# Patient Record
Sex: MALE | Race: WHITE | Employment: UNEMPLOYED | URBAN - METROPOLITAN AREA
[De-identification: names, ages, dates, MRNs, and addresses within clinical notes are randomized per-mention and may not be internally consistent; named-entity substitution may affect disease eponyms.]

---
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
## 2022-01-28 ENCOUNTER — OFFICE VISIT (OUTPATIENT)
Dept: INTERNAL MEDICINE CLINIC | Age: 22
End: 2022-01-28
Payer: MEDICARE

## 2022-01-28 VITALS
HEIGHT: 67 IN | WEIGHT: 144.8 LBS | HEART RATE: 77 BPM | SYSTOLIC BLOOD PRESSURE: 148 MMHG | OXYGEN SATURATION: 99 % | DIASTOLIC BLOOD PRESSURE: 80 MMHG | BODY MASS INDEX: 22.73 KG/M2

## 2022-01-28 DIAGNOSIS — R42 DIZZINESS: Primary | ICD-10-CM

## 2022-01-28 DIAGNOSIS — U09.9 POST-COVID SYNDROME: ICD-10-CM

## 2022-01-28 PROCEDURE — 99203 OFFICE O/P NEW LOW 30 MIN: CPT | Performed by: INTERNAL MEDICINE

## 2022-01-28 PROCEDURE — G8427 DOCREV CUR MEDS BY ELIG CLIN: HCPCS | Performed by: INTERNAL MEDICINE

## 2022-01-28 PROCEDURE — 1036F TOBACCO NON-USER: CPT | Performed by: INTERNAL MEDICINE

## 2022-01-28 PROCEDURE — G8484 FLU IMMUNIZE NO ADMIN: HCPCS | Performed by: INTERNAL MEDICINE

## 2022-01-28 PROCEDURE — G8420 CALC BMI NORM PARAMETERS: HCPCS | Performed by: INTERNAL MEDICINE

## 2022-01-28 RX ORDER — MECLIZINE HCL 12.5 MG/1
12.5 TABLET ORAL 3 TIMES DAILY PRN
Qty: 15 TABLET | Refills: 1 | Status: CANCELLED | OUTPATIENT
Start: 2022-01-28 | End: 2022-02-07

## 2022-01-28 SDOH — ECONOMIC STABILITY: FOOD INSECURITY: WITHIN THE PAST 12 MONTHS, YOU WORRIED THAT YOUR FOOD WOULD RUN OUT BEFORE YOU GOT MONEY TO BUY MORE.: NEVER TRUE

## 2022-01-28 SDOH — ECONOMIC STABILITY: FOOD INSECURITY: WITHIN THE PAST 12 MONTHS, THE FOOD YOU BOUGHT JUST DIDN'T LAST AND YOU DIDN'T HAVE MONEY TO GET MORE.: NEVER TRUE

## 2022-01-28 ASSESSMENT — PATIENT HEALTH QUESTIONNAIRE - PHQ9
2. FEELING DOWN, DEPRESSED OR HOPELESS: 1
SUM OF ALL RESPONSES TO PHQ QUESTIONS 1-9: 1
SUM OF ALL RESPONSES TO PHQ9 QUESTIONS 1 & 2: 1
SUM OF ALL RESPONSES TO PHQ QUESTIONS 1-9: 1
1. LITTLE INTEREST OR PLEASURE IN DOING THINGS: 0

## 2022-01-28 ASSESSMENT — SOCIAL DETERMINANTS OF HEALTH (SDOH): HOW HARD IS IT FOR YOU TO PAY FOR THE VERY BASICS LIKE FOOD, HOUSING, MEDICAL CARE, AND HEATING?: NOT HARD AT ALL

## 2022-01-28 NOTE — PROGRESS NOTES
Visit Information    Have you changed or started any medications since your last visit including any over-the-counter medicines, vitamins, or herbal medicines? no   Are you having any side effects from any of your medications? -  no  Have you stopped taking any of your medications? Is so, why? -  no    Have you seen any other physician or provider since your last visit? Yes - Records Obtained  Have you had any other diagnostic tests since your last visit? No  Have you been seen in the emergency room and/or had an admission to a hospital since we last saw you? Yes - Records Obtained  Have you had your routine dental cleaning in the past 6 months? no    Have you activated your [employer] account? If not, what are your barriers?  No:      Patient Care Team:  Danika Gomez MD as PCP - General (Internal Medicine)    Medical History Review  Past Medical, Family, and Social History reviewed and does contribute to the patient presenting condition    Health Maintenance   Topic Date Due    Hepatitis C screen  Never done    Varicella vaccine (1 of 2 - 2-dose childhood series) Never done    COVID-19 Vaccine (1) Never done    HPV vaccine (1 - Male 2-dose series) Never done    Depression Screen  Never done    HIV screen  Never done    Flu vaccine (1) Never done    DTaP/Tdap/Td vaccine (1 - Tdap) Never done    Hepatitis A vaccine  Aged Out    Hepatitis B vaccine  Aged Out    Hib vaccine  Aged Out    Meningococcal (ACWY) vaccine  Aged Out    Pneumococcal 0-64 years Vaccine  Aged Out

## 2022-01-29 NOTE — PROGRESS NOTES
[address]  [place] [address]  Dept: [phone]  Dept Fax: [phone]     Name: Edmund Pastor  : 2000           Chief Complaint   Patient presents with    New Patient    Loss of Consciousness     first episode was 2 years ago, he stood up to fast. Has been continuing for the past two years    Post-COVID Symptoms     Patient states that he feels like it still sometimes hard for him to take a deep breath        History of Present Illness:    HPI  24-year-old gentleman, longstanding history of off-and-on dizziness,  Head episode of Covid 19 infection in December, went to the ER, given some medications, did not have any dizziness since then,  Seems like patient has a positional vertigo, does not have any chest pain shortness of breath or palpitation,  Does not have any post dizziness confusion, less likely seizures,  Less likely cardiac in neurological cause,  Still will get to table testing,    Past Medical History:    No past medical history on file. Reviewed all health maintenance requirements and ordered appropriate tests  Health Maintenance Due   Topic Date Due    Hepatitis C screen  Never done    Varicella vaccine (1 of 2 - 2-dose childhood series) Never done    COVID-19 Vaccine (1) Never done    HPV vaccine (1 - Male 2-dose series) Never done    Depression Screen  Never done    HIV screen  Never done    Flu vaccine (1) Never done    DTaP/Tdap/Td vaccine (1 - Tdap) Never done       Past Surgical History:    No past surgical history on file. Medications:    No current outpatient medications on file. Allergies:      Patient has no known allergies. Social History:    Tobacco:    reports that he has never smoked. He has never used smokeless tobacco.  Alcohol:      reports no history of alcohol use. Drug Use:  reports current drug use. Drug: Marijuana Nadeem Tesfaye). Family History:    No family history on file.     Review of Systems:    Positive and Negative as described in HPI    Constitutional:  negative for  fevers, chills, sweats, fatigue, and weight loss  HEENT:  negative for vision or hearing changes,   Respiratory:  negative for shortness of breath, cough, or congestion  Cardiovascular:  negative for  chest pain, palpitations  Gastrointestinal:  negative for nausea, vomiting, diarrhea, constipation, abdominal pain  Genitourinary:  negative for frequency, dysuria  Integument/Breast:  negative for rash, skin lesions  Musculoskeletal:  negative for muscle aches or joint pain  Neurological:  negative for headaches, dizziness, lightheadedness, numbness, pain and tingling extrimities  Behavior/Psych:  negative for depression and anxiety      Physical Exam:    Vitals:  BP (!) 148/80 (Site: Left Upper Arm, Position: Standing)   Pulse 77   Ht 5' 7\" (1.702 m)   Wt 144 lb 12.8 oz (65.7 kg)   SpO2 99%   BMI 22.68 kg/m²     General appearance - alert, well appearing, and in no acute distress  Mental status - oriented to person, place, and time with normal affect  Head - normocephalic and atraumatic  Eyes - pupils equal and reactive, extraocular eye movements intact, conjunctiva clear  Ears - hearing appears to be intact  Nose - no drainage noted  Mouth - mucous membranes moist  Neck - supple, no carotid bruits, thyroid not palpable  Chest - clear to auscultation, normal effort  Heart - normal rate, regular rhythm, no murmurs  Abdomen - soft, nontender, nondistended, bowel sounds present all four quadrants, no masses, hepatomegaly or splenomegaly  Neurological - normal speech, no focal findings or movement disorder noted, cranial nerves II through XII grossly intact  Extremities - peripheral pulses palpable, no pedal edema or calf pain with palpation  Skin - no gross lesions, rashes, or induration noted      Data:    Lab Results   Component Value Date     12/23/2021    K 3.7 12/23/2021     12/23/2021    CO2 25 12/23/2021 BUN 14 12/23/2021    CREATININE 0.84 12/23/2021    GLUCOSE 173 12/23/2021    PROT 7.4 12/23/2021    LABALBU 4.3 12/23/2021    BILITOT 0.29 12/23/2021    ALKPHOS 75 12/23/2021    AST 11 12/23/2021    ALT 7 12/23/2021     Lab Results   Component Value Date    WBC 7.3 12/23/2021    RBC 5.06 12/23/2021    HGB 14.5 12/23/2021    HCT 42.8 12/23/2021    MCV 84.6 12/23/2021    MCH 28.5 12/23/2021    MCHC 33.7 12/23/2021    RDW 13.2 12/23/2021     12/23/2021    MPV 8.6 12/23/2021     No results found for: TSH  No results found for: CHOL, LDL, HDL, PSA, LABA1C       Assessment & Plan:     Diagnosis Orders   1. Dizziness  Tilt Table Test    CBC Auto Differential    Comprehensive Metabolic Panel    Lipid Panel    Hemoglobin A1C    TSH without Reflex    Vitamin D 25 Hydroxy    Urinalysis   2. Post-COVID syndrome  Tilt Table Test    CBC Auto Differential    Comprehensive Metabolic Panel    Lipid Panel    Hemoglobin A1C    TSH without Reflex    Vitamin D 25 Hydroxy    Urinalysis       1. Dizziness  -     Tilt Table Test  -     CBC Auto Differential; Future  -     Comprehensive Metabolic Panel; Future  -     Lipid Panel; Future  -     Hemoglobin A1C; Future  -     TSH without Reflex; Future  -     Vitamin D 25 Hydroxy; Future  -     Urinalysis; Future  2. Post-COVID syndrome  -     Tilt Table Test  -     CBC Auto Differential; Future  -     Comprehensive Metabolic Panel; Future  -     Lipid Panel; Future  -     Hemoglobin A1C; Future  -     TSH without Reflex; Future  -     Vitamin D 25 Hydroxy; Future  -     Urinalysis; Future       Complete blood work,  To table testing,  If not better will get the CT of the head, will see the patient and to 4 weeks,  No neurological deficit at this time will  We will get meclizine as needed        Completed Refills   Requested Prescriptions      No prescriptions requested or ordered in this encounter     Return in about 4 weeks (around 2/25/2022).   No orders of the defined types were placed in this encounter. Orders Placed This Encounter   Procedures    CBC Auto Differential     Standing Status:   Future     Standing Expiration Date:   1/28/2023    Comprehensive Metabolic Panel     Standing Status:   Future     Standing Expiration Date:   1/28/2023    Lipid Panel     Standing Status:   Future     Standing Expiration Date:   1/28/2023     Order Specific Question:   Is Patient Fasting?/# of Hours     Answer:   8-10 Hours    Hemoglobin A1C     Standing Status:   Future     Standing Expiration Date:   1/28/2023    TSH without Reflex     Standing Status:   Future     Standing Expiration Date:   1/28/2023    Vitamin D 25 Hydroxy     Standing Status:   Future     Standing Expiration Date:   1/28/2023    Urinalysis     Standing Status:   Future     Standing Expiration Date:   1/28/2023     Order Specific Question:   SPECIFY(EX-CATH,MIDSTREAM,CYSTO,ETC)?      Answer:   Mid Stream    Tilt Table Test       Electronically signed by Jessee Smith MD on 1/28/2022 at 10:34 PM

## 2023-10-31 ENCOUNTER — HOSPITAL ENCOUNTER (EMERGENCY)
Age: 23
Discharge: HOME | End: 2023-10-31
Attending: EMERGENCY MEDICINE
Payer: MEDICAID

## 2023-10-31 ENCOUNTER — APPOINTMENT (OUTPATIENT)
Dept: CT IMAGING | Age: 23
End: 2023-10-31
Payer: MEDICAID

## 2023-10-31 VITALS
RESPIRATION RATE: 14 BRPM | DIASTOLIC BLOOD PRESSURE: 79 MMHG | HEIGHT: 66 IN | OXYGEN SATURATION: 97 % | BODY MASS INDEX: 24.91 KG/M2 | HEART RATE: 78 BPM | WEIGHT: 155 LBS | TEMPERATURE: 98.2 F | SYSTOLIC BLOOD PRESSURE: 136 MMHG

## 2023-10-31 DIAGNOSIS — R51.9 ACUTE NONINTRACTABLE HEADACHE, UNSPECIFIED HEADACHE TYPE: Primary | ICD-10-CM

## 2023-10-31 LAB
ANION GAP SERPL CALCULATED.3IONS-SCNC: 11 MMOL/L (ref 9–17)
BASOPHILS # BLD: <0.03 K/UL (ref 0–0.2)
BASOPHILS NFR BLD: 0 % (ref 0–2)
BUN SERPL-MCNC: 11 MG/DL (ref 6–20)
BUN/CREAT SERPL: 16 (ref 9–20)
CALCIUM SERPL-MCNC: 9.6 MG/DL (ref 8.6–10.4)
CHLORIDE SERPL-SCNC: 102 MMOL/L (ref 98–107)
CO2 SERPL-SCNC: 27 MMOL/L (ref 20–31)
CREAT SERPL-MCNC: 0.7 MG/DL (ref 0.7–1.2)
EOSINOPHIL # BLD: 0.04 K/UL (ref 0–0.44)
EOSINOPHILS RELATIVE PERCENT: 0 % (ref 1–4)
ERYTHROCYTE [DISTWIDTH] IN BLOOD BY AUTOMATED COUNT: 12.5 % (ref 11.8–14.4)
FLUAV RNA RESP QL NAA+PROBE: NOT DETECTED
FLUBV RNA RESP QL NAA+PROBE: NOT DETECTED
GFR SERPL CREATININE-BSD FRML MDRD: >60 ML/MIN/1.73M2
GLUCOSE SERPL-MCNC: 126 MG/DL (ref 70–99)
HCT VFR BLD AUTO: 42.8 % (ref 40.7–50.3)
HGB BLD-MCNC: 14.5 G/DL (ref 13–17)
IMM GRANULOCYTES # BLD AUTO: 0.06 K/UL (ref 0–0.3)
IMM GRANULOCYTES NFR BLD: 1 %
LYMPHOCYTES NFR BLD: 0.96 K/UL (ref 1.2–5.2)
LYMPHOCYTES RELATIVE PERCENT: 9 % (ref 25–45)
MCH RBC QN AUTO: 29.2 PG (ref 25.2–33.5)
MCHC RBC AUTO-ENTMCNC: 33.9 G/DL (ref 28.4–34.8)
MCV RBC AUTO: 86.3 FL (ref 82.6–102.9)
MONOCYTES NFR BLD: 0.76 K/UL (ref 0.1–1.4)
MONOCYTES NFR BLD: 7 % (ref 2–8)
NEUTROPHILS NFR BLD: 83 % (ref 34–64)
NEUTS SEG NFR BLD: 8.9 K/UL (ref 1.8–8)
NRBC BLD-RTO: 0 PER 100 WBC
PLATELET # BLD AUTO: 169 K/UL (ref 138–453)
PMV BLD AUTO: 10.4 FL (ref 8.1–13.5)
POTASSIUM SERPL-SCNC: 4.2 MMOL/L (ref 3.7–5.3)
RBC # BLD AUTO: 4.96 M/UL (ref 4.21–5.77)
SARS-COV-2 RNA RESP QL NAA+PROBE: NOT DETECTED
SODIUM SERPL-SCNC: 140 MMOL/L (ref 135–144)
SOURCE: NORMAL
SPECIMEN DESCRIPTION: NORMAL
WBC OTHER # BLD: 10.7 K/UL (ref 4.5–13.5)

## 2023-10-31 PROCEDURE — 70450 CT HEAD/BRAIN W/O DYE: CPT

## 2023-10-31 PROCEDURE — 96375 TX/PRO/DX INJ NEW DRUG ADDON: CPT

## 2023-10-31 PROCEDURE — 6360000002 HC RX W HCPCS: Performed by: EMERGENCY MEDICINE

## 2023-10-31 PROCEDURE — 85025 COMPLETE CBC W/AUTO DIFF WBC: CPT

## 2023-10-31 PROCEDURE — 96365 THER/PROPH/DIAG IV INF INIT: CPT

## 2023-10-31 PROCEDURE — 99284 EMERGENCY DEPT VISIT MOD MDM: CPT

## 2023-10-31 PROCEDURE — 87636 SARSCOV2 & INF A&B AMP PRB: CPT

## 2023-10-31 PROCEDURE — 2580000003 HC RX 258: Performed by: EMERGENCY MEDICINE

## 2023-10-31 PROCEDURE — 80048 BASIC METABOLIC PNL TOTAL CA: CPT

## 2023-10-31 RX ORDER — KETOROLAC TROMETHAMINE 30 MG/ML
30 INJECTION, SOLUTION INTRAMUSCULAR; INTRAVENOUS ONCE
Status: COMPLETED | OUTPATIENT
Start: 2023-10-31 | End: 2023-10-31

## 2023-10-31 RX ORDER — 0.9 % SODIUM CHLORIDE 0.9 %
1000 INTRAVENOUS SOLUTION INTRAVENOUS ONCE
Status: COMPLETED | OUTPATIENT
Start: 2023-10-31 | End: 2023-10-31

## 2023-10-31 RX ORDER — DEXAMETHASONE SODIUM PHOSPHATE 10 MG/ML
10 INJECTION, SOLUTION INTRAMUSCULAR; INTRAVENOUS ONCE
Status: COMPLETED | OUTPATIENT
Start: 2023-10-31 | End: 2023-10-31

## 2023-10-31 RX ORDER — DIPHENHYDRAMINE HYDROCHLORIDE 50 MG/ML
25 INJECTION INTRAMUSCULAR; INTRAVENOUS ONCE
Status: COMPLETED | OUTPATIENT
Start: 2023-10-31 | End: 2023-10-31

## 2023-10-31 RX ORDER — ONDANSETRON 2 MG/ML
4 INJECTION INTRAMUSCULAR; INTRAVENOUS ONCE
Status: COMPLETED | OUTPATIENT
Start: 2023-10-31 | End: 2023-10-31

## 2023-10-31 RX ORDER — MAGNESIUM SULFATE 1 G/100ML
1000 INJECTION INTRAVENOUS ONCE
Status: COMPLETED | OUTPATIENT
Start: 2023-10-31 | End: 2023-10-31

## 2023-10-31 RX ADMIN — ONDANSETRON 4 MG: 2 INJECTION INTRAMUSCULAR; INTRAVENOUS at 08:30

## 2023-10-31 RX ADMIN — MAGNESIUM SULFATE HEPTAHYDRATE 1000 MG: 1 INJECTION, SOLUTION INTRAVENOUS at 09:57

## 2023-10-31 RX ADMIN — SODIUM CHLORIDE 1000 ML: 9 INJECTION, SOLUTION INTRAVENOUS at 08:30

## 2023-10-31 RX ADMIN — KETOROLAC TROMETHAMINE 30 MG: 30 INJECTION, SOLUTION INTRAMUSCULAR; INTRAVENOUS at 09:12

## 2023-10-31 RX ADMIN — SODIUM CHLORIDE 1000 ML: 9 INJECTION, SOLUTION INTRAVENOUS at 09:56

## 2023-10-31 RX ADMIN — DEXAMETHASONE SODIUM PHOSPHATE 10 MG: 10 INJECTION, SOLUTION INTRAMUSCULAR; INTRAVENOUS at 09:12

## 2023-10-31 RX ADMIN — DIPHENHYDRAMINE HYDROCHLORIDE 25 MG: 50 INJECTION INTRAMUSCULAR; INTRAVENOUS at 08:30

## 2023-10-31 ASSESSMENT — ENCOUNTER SYMPTOMS
EYE DISCHARGE: 0
ABDOMINAL PAIN: 0
EYE PAIN: 0
ABDOMINAL DISTENTION: 0
SHORTNESS OF BREATH: 0
CHEST TIGHTNESS: 0
BACK PAIN: 0
FACIAL SWELLING: 0

## 2023-10-31 ASSESSMENT — PAIN SCALES - GENERAL: PAINLEVEL_OUTOF10: 5

## 2023-10-31 ASSESSMENT — PAIN - FUNCTIONAL ASSESSMENT: PAIN_FUNCTIONAL_ASSESSMENT: 0-10

## 2023-10-31 ASSESSMENT — LIFESTYLE VARIABLES
HOW MANY STANDARD DRINKS CONTAINING ALCOHOL DO YOU HAVE ON A TYPICAL DAY: PATIENT DOES NOT DRINK
HOW OFTEN DO YOU HAVE A DRINK CONTAINING ALCOHOL: NEVER

## 2023-10-31 ASSESSMENT — PAIN DESCRIPTION - PAIN TYPE: TYPE: ACUTE PAIN

## 2023-10-31 ASSESSMENT — PAIN DESCRIPTION - ORIENTATION: ORIENTATION: POSTERIOR

## 2023-10-31 ASSESSMENT — PAIN DESCRIPTION - LOCATION: LOCATION: HEAD

## 2023-10-31 NOTE — ED PROVIDER NOTES
EMERGENCY DEPARTMENT ENCOUNTER    Pt Name: Mahi Luu  MRN: [id number]  [address] [facility] 2000  Date of evaluation: 10/31/23  CHIEF COMPLAINT       Chief Complaint   Patient presents with    Migraine     x5 days, no reported personal history has family history of mirgraines. HISTORY OF PRESENT ILLNESS   HPI  The patient is a 74-year-old male with history of migraine headaches who presented to the emergency department secondary to headache. Patient complains of a 5-day history of headache. Gradual onset localized to the back of his head with radiation to the front. He has had nausea and vomiting. He is taking over-the-counter medicine with no significant improvement. Of note, patient suddenly had food poisoning has had nausea and vomiting. Denied any trauma or injury. He was previously on Keppra for epilepsy does no longer take that medication. Patient denies vision changes or photophobia. He rates the pain 5 out of 10 on the pain scale localized to the back of his head with radiation to the front. Patient [place] chest pain, shortness of breath, nausea, vomiting, fevers or chills      REVIEW OF SYSTEMS     Review of Systems   Constitutional:  Negative for chills, diaphoresis and fever. HENT:  Negative for congestion, ear pain and facial swelling. Eyes:  Negative for pain, discharge and visual disturbance. Respiratory:  Negative for chest tightness and shortness of breath. Cardiovascular:  Negative for chest pain and palpitations. Gastrointestinal:  Negative for abdominal distention and abdominal pain. Genitourinary:  Negative for difficulty urinating and flank pain. Musculoskeletal:  Negative for back pain. Skin:  Negative for wound. Neurological:  Positive for headaches. Negative for dizziness, syncope, weakness, light-headedness and numbness. PASTMEDICAL HISTORY   History reviewed. No pertinent past medical history.   Past Problem List  There is no problem list on file for this

## 2023-12-02 ENCOUNTER — HOSPITAL ENCOUNTER (EMERGENCY)
Age: 23
Discharge: HOME | End: 2023-12-02
Attending: EMERGENCY MEDICINE
Payer: COMMERCIAL

## 2023-12-02 ENCOUNTER — APPOINTMENT (OUTPATIENT)
Dept: GENERAL RADIOLOGY | Age: 23
End: 2023-12-02
Payer: COMMERCIAL

## 2023-12-02 VITALS
RESPIRATION RATE: 16 BRPM | TEMPERATURE: 98.3 F | HEIGHT: 66 IN | BODY MASS INDEX: 25.02 KG/M2 | SYSTOLIC BLOOD PRESSURE: 137 MMHG | OXYGEN SATURATION: 100 % | DIASTOLIC BLOOD PRESSURE: 73 MMHG | HEART RATE: 99 BPM

## 2023-12-02 DIAGNOSIS — R07.89 ATYPICAL CHEST PAIN: Primary | ICD-10-CM

## 2023-12-02 LAB
EKG ATRIAL RATE: 89 BPM
EKG P AXIS: 61 DEGREES
EKG P-R INTERVAL: 136 MS
EKG Q-T INTERVAL: 352 MS
EKG QRS DURATION: 80 MS
EKG QTC CALCULATION (BAZETT): 428 MS
EKG R AXIS: 47 DEGREES
EKG T AXIS: 38 DEGREES
EKG VENTRICULAR RATE: 89 BPM

## 2023-12-02 PROCEDURE — 93005 ELECTROCARDIOGRAM TRACING: CPT

## 2023-12-02 PROCEDURE — 71046 X-RAY EXAM CHEST 2 VIEWS: CPT

## 2023-12-02 PROCEDURE — 6360000002 HC RX W HCPCS

## 2023-12-02 PROCEDURE — 99284 EMERGENCY DEPT VISIT MOD MDM: CPT

## 2023-12-02 PROCEDURE — 96372 THER/PROPH/DIAG INJ SC/IM: CPT

## 2023-12-02 RX ORDER — KETOROLAC TROMETHAMINE 30 MG/ML
30 INJECTION, SOLUTION INTRAMUSCULAR; INTRAVENOUS ONCE
Status: COMPLETED | OUTPATIENT
Start: 2023-12-02 | End: 2023-12-02

## 2023-12-02 RX ORDER — NAPROXEN 500 MG/1
500 TABLET ORAL 2 TIMES DAILY WITH MEALS
COMMUNITY
Start: 2023-11-28

## 2023-12-02 RX ORDER — METHOCARBAMOL 750 MG/1
TABLET, FILM COATED ORAL
COMMUNITY
Start: 2023-11-29

## 2023-12-02 RX ADMIN — KETOROLAC TROMETHAMINE 30 MG: 30 INJECTION, SOLUTION INTRAMUSCULAR at 21:13

## 2023-12-02 ASSESSMENT — ENCOUNTER SYMPTOMS
SORE THROAT: 0
VOMITING: 0
NAUSEA: 0
COUGH: 1
SINUS PRESSURE: 0
BACK PAIN: 1
DIARRHEA: 0
CHEST TIGHTNESS: 0
SINUS PAIN: 0
CONSTIPATION: 0
ABDOMINAL PAIN: 0
SHORTNESS OF BREATH: 0

## 2023-12-02 ASSESSMENT — PAIN DESCRIPTION - LOCATION: LOCATION: SHOULDER

## 2023-12-02 ASSESSMENT — PAIN SCALES - GENERAL
PAINLEVEL_OUTOF10: 5
PAINLEVEL_OUTOF10: 6

## 2023-12-02 ASSESSMENT — PAIN - FUNCTIONAL ASSESSMENT: PAIN_FUNCTIONAL_ASSESSMENT: 0-10

## 2023-12-02 ASSESSMENT — PAIN DESCRIPTION - DESCRIPTORS: DESCRIPTORS: SHARP

## 2023-12-02 ASSESSMENT — PAIN DESCRIPTION - ORIENTATION: ORIENTATION: RIGHT

## 2023-12-03 NOTE — ED PROVIDER NOTES
[address] [facility] ED  eMERGENCY dEPARTMENT eNCOUnter      Pt Name: Twyla Quevedo  MRN: [id number]  [address] [facility] 2000  Date of evaluation: 12/2/2023  Provider: DONNA David - [address] [facility]       Chief Complaint   Patient presents with    Back Pain     Lower back pain; ongoing for 3 weeks; reports also abd pain      Shortness of Breath     Reports unable to take deep breath; reports been on going for past 3 weeks    Shoulder Pain     Right sided; reports no injury; reports pain increases with deep breath         HISTORY OF PRESENT ILLNESS  (Location/Symptom, Timing/Onset, Context/Setting, Quality, Duration, Modifying Factors, Severity.)   Twyla Quevedo is a 26 y.o. male who presents to the emergency department with complaint of right upper back pain, right shoulder pain, right lower rib pain worse with inspiration. Patient was evaluated in urgent care 5 days ago and was diagnosed with a muscle strain, given naproxen and muscle relaxant. Patient states he has been taking them as prescribed without any relief. Patient states he did have a viral upper respiratory infection over a month ago and was treated for an ear infection. No other antibiotics since ear infection antibiotics were completed. No cardiac history, no exogenous hormone use, no recent long distance travel, no recent surgery, no sick contacts at home no history of blood clots, no trauma or injury, no heavy lifting. Patient states that he does vaping nicotine vape, no drugs or alcohol. Patient states the pain is always there 5 out of 10, worse with deep breaths and certain movements. Nursing Notes were reviewed. ALLERGIES     Patient has no known allergies.     CURRENT MEDICATIONS       Discharge Medication List as of 12/2/2023  9:51 PM        CONTINUE these medications which have NOT CHANGED    Details   methocarbamol (ROBAXIN) 750 MG tablet take 1 tablet by mouth four times a day if needed for muscle spasmHistorical

## 2023-12-03 NOTE — ED NOTES
Patient presents with c/o right shoulder pain, worsened by taking deep breaths, which has been ongoing for 3 weeks. Patient denies any accident, injury or trauma. Patient presents in no acute distress, respirations appear regular and unlabored.       Jung Waters RN  12/02/23 [id number]

## 2023-12-04 PROCEDURE — 93010 ELECTROCARDIOGRAM REPORT: CPT | Performed by: INTERNAL MEDICINE

## 2023-12-06 NOTE — ED PROVIDER NOTES
eMERGENCY dEPARTMENT eNCOUnter   [address] Name: Derrick Nunez  MRN: [id number]  [address] [place] 2000  Date of evaluation: 12/6/23     Derrick Nunez is a 26 y.o. male with CC: Back Pain (Lower back pain; ongoing for 3 weeks; reports also abd pain/), Shortness of Breath (Reports unable to take deep breath; reports been on going for past 3 weeks), and Shoulder Pain (Right sided; reports no injury; reports pain increases with deep breath)      This visit was performed by both a physician and an APC. I performed all aspects of the MDM as documented. Based on the medical record the care appears appropriate. I was personally available for consultation in the Emergency Department.     The care is provided during an unprecedented national emergency due to the novel coronavirus, Dori Castleman, MD  Attending Emergency Physician                  Bola Glynn MD  12/06/23 6062